# Patient Record
Sex: FEMALE | Race: WHITE | Employment: STUDENT | URBAN - METROPOLITAN AREA
[De-identification: names, ages, dates, MRNs, and addresses within clinical notes are randomized per-mention and may not be internally consistent; named-entity substitution may affect disease eponyms.]

---
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
## 2022-01-24 NOTE — PROGRESS NOTES
Abbey Alonzo is a 25year old female. Patient presents with: Follow - Up: on labs      HPI:   Anxiety: better, but still not great. Fatigue, joint pains in all joints, shoulders, hands. Decreased appetite.    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 both shoulder joints  -     RHEUMATOLOGY - INTERNAL  - refer to rheumatology for evaluation. Monitor abd pain for now.          Orders Placed This Encounter      CBC With Differential With Platelet          Standing Status: Future          Number of Occ

## 2022-02-03 ENCOUNTER — TELEPHONE (OUTPATIENT)
Dept: FAMILY MEDICINE CLINIC | Facility: CLINIC | Age: 22
End: 2022-02-03

## 2022-02-03 DIAGNOSIS — J30.89 ENVIRONMENTAL AND SEASONAL ALLERGIES: ICD-10-CM

## 2022-02-03 RX ORDER — MONTELUKAST SODIUM 10 MG/1
10 TABLET ORAL NIGHTLY
Qty: 90 TABLET | Refills: 1 | Status: SHIPPED | OUTPATIENT
Start: 2022-02-03

## 2022-02-03 NOTE — TELEPHONE ENCOUNTER
Asthma & COPD Medication Protocol Failed 02/03/2022 01:52 PM    Asthma Action Score greater than or equal to 20    AAP/ACT given in last 12 months    Appointment in past 6 or next 3 months    Routing to provider per protocol. montelukast 10 MG Oral Tab  Last refilled on 9/7/21 for #30  with 1 rf. Last labs 2/2/22. Last seen on 1/24/22. No future appointments. Thank you.

## 2022-02-03 NOTE — TELEPHONE ENCOUNTER
See results, iron is quite low and she is anemic. I would like her to take iron 325 mg twice daily for the next 4-6 weeks, then lets recheck her iron level and blood cell counts. This can definitely make her feel tired, and even make joint pains worse. Recall CBC and ferritin in 6 weeks.

## 2022-02-05 RX ORDER — NORTRIPTYLINE HYDROCHLORIDE 10 MG/1
20 CAPSULE ORAL NIGHTLY
Qty: 60 CAPSULE | Refills: 0 | Status: SHIPPED | OUTPATIENT
Start: 2022-02-05 | End: 2022-03-08

## 2022-02-09 ENCOUNTER — TELEPHONE (OUTPATIENT)
Dept: FAMILY MEDICINE CLINIC | Facility: CLINIC | Age: 22
End: 2022-02-09

## 2022-02-09 LAB
ABSOLUTE EOSINOPHILS: 59 CELLS/UL (ref 15–500)
ABSOLUTE LYMPHOCYTES: 1188 CELLS/UL (ref 850–3900)
ABSOLUTE MONOCYTES: 627 CELLS/UL (ref 200–950)
ABSOLUTE NEUTROPHILS: 4693 CELLS/UL (ref 1500–7800)
BASOPHILS: 0.5 %
EOSINOPHILS: 0.9 %
FERRITIN: 5 NG/ML (ref 16–154)
HEMATOCRIT: 32.3 % (ref 35–45)
HEMOGLOBIN: 10.2 G/DL (ref 11.7–15.5)
LYMPHOCYTES: 18 %
MCH: 24.8 PG (ref 27–33)
MCHC: 31.6 G/DL (ref 32–36)
MCV: 78.4 FL (ref 80–100)
MONOCYTES: 9.5 %
MPV: 10.9 FL (ref 7.5–12.5)
NEUTROPHILS: 71.1 %
PLATELET COUNT: 362 THOUSAND/UL (ref 140–400)
RDW: 14.3 % (ref 11–15)
RED BLOOD CELL COUNT: 4.12 MILLION/UL (ref 3.8–5.1)
WHITE BLOOD CELL COUNT: 6.6 THOUSAND/UL (ref 3.8–10.8)

## 2022-02-17 ENCOUNTER — TELEMEDICINE (OUTPATIENT)
Dept: NEUROLOGY | Facility: CLINIC | Age: 22
End: 2022-02-17
Payer: COMMERCIAL

## 2022-02-17 DIAGNOSIS — G43.909 MIGRAINE WITHOUT STATUS MIGRAINOSUS, NOT INTRACTABLE, UNSPECIFIED MIGRAINE TYPE: Primary | ICD-10-CM

## 2022-02-17 PROCEDURE — 99213 OFFICE O/P EST LOW 20 MIN: CPT | Performed by: HOSPITALIST

## 2022-02-17 RX ORDER — RIBOFLAVIN (VITAMIN B2) 400 MG
400 TABLET ORAL DAILY
Qty: 60 TABLET | Refills: 3 | Status: SHIPPED | OUTPATIENT
Start: 2022-02-17 | End: 2022-03-19

## 2022-02-17 RX ORDER — PREDNISONE 20 MG/1
TABLET ORAL
Qty: 3 TABLET | Refills: 0 | Status: SHIPPED | OUTPATIENT
Start: 2022-02-17 | End: 2022-02-19

## 2022-03-08 RX ORDER — NORTRIPTYLINE HYDROCHLORIDE 10 MG/1
CAPSULE ORAL
Qty: 60 CAPSULE | Refills: 2 | Status: SHIPPED | OUTPATIENT
Start: 2022-03-08

## 2022-03-15 ENCOUNTER — TELEPHONE (OUTPATIENT)
Dept: FAMILY MEDICINE CLINIC | Facility: CLINIC | Age: 22
End: 2022-03-15

## 2022-03-24 LAB
ABSOLUTE BASOPHILS: 44 CELLS/UL (ref 0–200)
ABSOLUTE EOSINOPHILS: 44 CELLS/UL (ref 15–500)
ABSOLUTE LYMPHOCYTES: 1584 CELLS/UL (ref 850–3900)
ABSOLUTE MONOCYTES: 854 CELLS/UL (ref 200–950)
ABSOLUTE NEUTROPHILS: 4774 CELLS/UL (ref 1500–7800)
BASOPHILS: 0.6 %
EOSINOPHILS: 0.6 %
FERRITIN: 11 NG/ML (ref 16–154)
HEMATOCRIT: 40.7 % (ref 35–45)
HEMOGLOBIN: 13.3 G/DL (ref 11.7–15.5)
LYMPHOCYTES: 21.7 %
MCH: 26.9 PG (ref 27–33)
MCHC: 32.7 G/DL (ref 32–36)
MCV: 82.2 FL (ref 80–100)
MONOCYTES: 11.7 %
MPV: 10.8 FL (ref 7.5–12.5)
NEUTROPHILS: 65.4 %
PLATELET COUNT: 355 THOUSAND/UL (ref 140–400)
RDW: 16.1 % (ref 11–15)
RED BLOOD CELL COUNT: 4.95 MILLION/UL (ref 3.8–5.1)
WHITE BLOOD CELL COUNT: 7.3 THOUSAND/UL (ref 3.8–10.8)

## 2022-04-12 ENCOUNTER — TELEPHONE (OUTPATIENT)
Dept: FAMILY MEDICINE CLINIC | Facility: CLINIC | Age: 22
End: 2022-04-12

## 2022-04-12 NOTE — TELEPHONE ENCOUNTER
Patient states chest pain started last year and was checked out.  ekg was fine. Month and a half ago started getting worse. A little short of breath. Sitting makes it better. A little bit of dizziness when she goes up stairs. States she feels like she might pass out once a day. Usually if she moves too fast.     States she started levothyroxine 25 mcg about a month and a half ago. Has not had thyroid rechecked. Discussed with KE, OK to see later this week as this has been ongoing the past 1-2 months. If any worsening go to ER    Patient notified and verbalized understanding.    Future Appointments   Date Time Provider Miguel Angel Gotti   4/14/2022 11:30 AM DO MEÑO Akers EMG Vania Alvarez

## 2022-04-14 ENCOUNTER — OFFICE VISIT (OUTPATIENT)
Dept: FAMILY MEDICINE CLINIC | Facility: CLINIC | Age: 22
End: 2022-04-14
Payer: COMMERCIAL

## 2022-04-14 VITALS
HEIGHT: 68 IN | OXYGEN SATURATION: 98 % | BODY MASS INDEX: 19.55 KG/M2 | RESPIRATION RATE: 16 BRPM | TEMPERATURE: 99 F | WEIGHT: 129 LBS

## 2022-04-14 DIAGNOSIS — R42 ORTHOSTATIC DIZZINESS: ICD-10-CM

## 2022-04-14 DIAGNOSIS — D50.9 IRON DEFICIENCY ANEMIA, UNSPECIFIED IRON DEFICIENCY ANEMIA TYPE: ICD-10-CM

## 2022-04-14 DIAGNOSIS — R07.89 OTHER CHEST PAIN: Primary | ICD-10-CM

## 2022-04-14 DIAGNOSIS — R00.0 TACHYCARDIA: ICD-10-CM

## 2022-04-14 PROBLEM — R53.82 CHRONIC FATIGUE: Status: ACTIVE | Noted: 2022-02-03

## 2022-04-14 PROBLEM — R76.8 THYROID ANTIBODY POSITIVE: Status: ACTIVE | Noted: 2022-02-03

## 2022-04-14 PROBLEM — F41.9 ANXIETY: Status: ACTIVE | Noted: 2022-02-03

## 2022-04-14 PROCEDURE — 3008F BODY MASS INDEX DOCD: CPT | Performed by: FAMILY MEDICINE

## 2022-04-14 PROCEDURE — 99214 OFFICE O/P EST MOD 30 MIN: CPT | Performed by: FAMILY MEDICINE

## 2022-04-18 ENCOUNTER — HOSPITAL ENCOUNTER (OUTPATIENT)
Dept: GENERAL RADIOLOGY | Age: 22
Discharge: HOME | End: 2022-04-18
Attending: FAMILY MEDICINE
Payer: OTHER GOVERNMENT

## 2022-04-18 DIAGNOSIS — R00.0 TACHYCARDIA: ICD-10-CM

## 2022-04-18 DIAGNOSIS — R07.89 OTHER CHEST PAIN: ICD-10-CM

## 2022-04-18 PROCEDURE — 93225 XTRNL ECG REC<48 HRS REC: CPT | Performed by: FAMILY MEDICINE

## 2022-04-27 ENCOUNTER — HOSPITAL ENCOUNTER (OUTPATIENT)
Dept: CV DIAGNOSTICS | Age: 22
Discharge: HOME | End: 2022-04-27
Attending: FAMILY MEDICINE
Payer: OTHER GOVERNMENT

## 2022-04-27 DIAGNOSIS — R42 ORTHOSTATIC DIZZINESS: ICD-10-CM

## 2022-04-27 DIAGNOSIS — R00.0 TACHYCARDIA: ICD-10-CM

## 2022-04-27 DIAGNOSIS — R07.89 OTHER CHEST PAIN: ICD-10-CM

## 2022-04-27 PROCEDURE — 93306 TTE W/DOPPLER COMPLETE: CPT | Performed by: FAMILY MEDICINE

## 2022-05-02 RX ORDER — ESCITALOPRAM OXALATE 10 MG/1
10 TABLET ORAL DAILY
Qty: 90 TABLET | Refills: 0 | Status: SHIPPED | OUTPATIENT
Start: 2022-05-02

## 2022-05-04 RX ORDER — ESCITALOPRAM OXALATE 10 MG/1
10 TABLET ORAL DAILY
Qty: 90 TABLET | Refills: 0 | OUTPATIENT
Start: 2022-05-04

## 2022-05-17 ENCOUNTER — TELEPHONE (OUTPATIENT)
Dept: FAMILY MEDICINE CLINIC | Facility: CLINIC | Age: 22
End: 2022-05-17

## 2022-05-17 NOTE — TELEPHONE ENCOUNTER
Patient states fever started Friday, temp 100.2  No temp yesterday  Had some body aches and chills. States she is a little congested. Patient states she can not go to work with rash on her hands. Works at [employer] as a .     Will send picture via Vidyo

## 2022-05-17 NOTE — TELEPHONE ENCOUNTER
Note written for today. Use vaseline or aquaphor to sooth it today and let me know if it's still there later this week.

## 2022-05-17 NOTE — TELEPHONE ENCOUNTER
Patient had fever for 4 days. Once fever subsided, a rash appeared on both hands.   Very itchy  Looks like hives  Keeps getting worse    Benadryl both topical and oral doesn't seem to be helping much    Please adv    Thank you

## 2022-05-31 ENCOUNTER — OFFICE VISIT (OUTPATIENT)
Dept: RHEUMATOLOGY | Facility: CLINIC | Age: 22
End: 2022-05-31
Payer: OTHER GOVERNMENT

## 2022-05-31 VITALS
OXYGEN SATURATION: 99 % | HEIGHT: 68 IN | TEMPERATURE: 98 F | WEIGHT: 130 LBS | RESPIRATION RATE: 16 BRPM | DIASTOLIC BLOOD PRESSURE: 60 MMHG | HEART RATE: 104 BPM | SYSTOLIC BLOOD PRESSURE: 98 MMHG | BODY MASS INDEX: 19.7 KG/M2

## 2022-05-31 DIAGNOSIS — R76.8 THYROID ANTIBODY POSITIVE: ICD-10-CM

## 2022-05-31 DIAGNOSIS — R04.0 EPISTAXIS, RECURRENT: ICD-10-CM

## 2022-05-31 DIAGNOSIS — R76.8 ANA POSITIVE: Primary | ICD-10-CM

## 2022-05-31 DIAGNOSIS — R53.82 CHRONIC FATIGUE: ICD-10-CM

## 2022-05-31 DIAGNOSIS — M25.50 POLYARTHRALGIA: ICD-10-CM

## 2022-05-31 DIAGNOSIS — D50.9 IRON DEFICIENCY ANEMIA, UNSPECIFIED IRON DEFICIENCY ANEMIA TYPE: ICD-10-CM

## 2022-05-31 DIAGNOSIS — E55.9 VITAMIN D DEFICIENCY: ICD-10-CM

## 2022-05-31 PROCEDURE — 99244 OFF/OP CNSLTJ NEW/EST MOD 40: CPT | Performed by: INTERNAL MEDICINE

## 2022-05-31 PROCEDURE — 3078F DIAST BP <80 MM HG: CPT | Performed by: INTERNAL MEDICINE

## 2022-05-31 PROCEDURE — 3008F BODY MASS INDEX DOCD: CPT | Performed by: INTERNAL MEDICINE

## 2022-05-31 PROCEDURE — 3074F SYST BP LT 130 MM HG: CPT | Performed by: INTERNAL MEDICINE

## 2022-06-14 DIAGNOSIS — R51.9 SINUS HEADACHE: ICD-10-CM

## 2022-06-15 RX ORDER — NORTRIPTYLINE HYDROCHLORIDE 10 MG/1
20 CAPSULE ORAL NIGHTLY
Qty: 60 CAPSULE | Refills: 0 | Status: SHIPPED | OUTPATIENT
Start: 2022-06-15

## 2022-06-17 LAB
ABSOLUTE BASOPHILS: 33 CELLS/UL (ref 0–200)
ABSOLUTE EOSINOPHILS: 72 CELLS/UL (ref 15–500)
ABSOLUTE LYMPHOCYTES: 1658 CELLS/UL (ref 850–3900)
ABSOLUTE MONOCYTES: 787 CELLS/UL (ref 200–950)
ABSOLUTE NEUTROPHILS: 3952 CELLS/UL (ref 1500–7800)
BASOPHILS: 0.5 %
EOSINOPHILS: 1.1 %
HEMATOCRIT: 38.5 % (ref 35–45)
HEMOGLOBIN: 12.7 G/DL (ref 11.7–15.5)
LYMPHOCYTES: 25.5 %
MCH: 28.7 PG (ref 27–33)
MCHC: 33 G/DL (ref 32–36)
MCV: 86.9 FL (ref 80–100)
MONOCYTES: 12.1 %
MPV: 10.5 FL (ref 7.5–12.5)
NEUTROPHILS: 60.8 %
PLATELET COUNT: 272 THOUSAND/UL (ref 140–400)
RDW: 12.2 % (ref 11–15)
RED BLOOD CELL COUNT: 4.43 MILLION/UL (ref 3.8–5.1)
WHITE BLOOD CELL COUNT: 6.5 THOUSAND/UL (ref 3.8–10.8)

## 2022-06-22 LAB
% SATURATION: 20 % (CALC) (ref 15–45)
14.3.3 ETA PROTEIN: <0.2 NG/ML
ALBUMIN/GLOBULIN RATIO: 1.5 (CALC) (ref 1–2.5)
ALBUMIN: 4.2 G/DL (ref 3.6–5.1)
ALKALINE PHOSPHATASE: 72 U/L (ref 36–128)
ALT: 15 U/L (ref 5–32)
ANA SCREEN, IFA: NEGATIVE
AST: 20 U/L (ref 12–32)
B2 GLYCOPROTEIN I (IGA)AB: <2 U/ML
B2 GLYCOPROTEIN I (IGG)AB: <2 U/ML
B2 GLYCOPROTEIN I(IGM)AB: <2 U/ML
BILIRUBIN, TOTAL: 0.3 MG/DL (ref 0.2–1.1)
BUN: 8 MG/DL (ref 7–20)
CALCIUM: 9.1 MG/DL (ref 8.9–10.4)
CARBON DIOXIDE: 24 MMOL/L (ref 20–32)
CARDIOLIPIN AB (IGA): <2 APL-U/ML
CARDIOLIPIN AB (IGG): <2 GPL-U/ML
CARDIOLIPIN AB (IGM): <2 MPL-U/ML
CHLORIDE: 103 MMOL/L (ref 98–110)
COMPLEMENT COMPONENT C3C: 104 MG/DL (ref 83–193)
COMPLEMENT COMPONENT C4C: 15 MG/DL (ref 15–57)
CREATININE: 0.66 MG/DL (ref 0.5–1)
CYCLIC CITRULLINATED$PEPTIDE (CCP) AB (IGG): <16 UNITS
DNA AB (DS) CRITHIDIA,IFA: NEGATIVE
EGFR IF AFRICN AM: 148 ML/MIN/1.73M2
EGFR IF NONAFRICN AM: 128 ML/MIN/1.73M2
FERRITIN: 18 NG/ML (ref 16–154)
GLOBULIN: 2.8 G/DL (CALC) (ref 2–3.8)
GLUCOSE: 66 MG/DL (ref 65–99)
IRON BINDING CAPACITY: 304 MCG/DL (CALC) (ref 271–448)
IRON, TOTAL: 62 MCG/DL (ref 27–164)
MYELOPEROXIDASE ANTIBODY: <1 AI
POTASSIUM: 3.7 MMOL/L (ref 3.8–5.1)
PROTEIN, TOTAL: 7 G/DL (ref 6.3–8.2)
PROTEINASE-3 ANTIBODY: <1 AI
RHEUMATOID FACTOR (IGA): <5 U
RHEUMATOID FACTOR (IGG): 6 U
RHEUMATOID FACTOR (IGM): <5 U
SODIUM: 138 MMOL/L (ref 135–146)
THYROID PEROXIDASE$ANTIBODIES: 169 IU/ML
VITAMIN D, 25-OH, TOTAL: 41 NG/ML (ref 30–100)

## 2022-06-28 ENCOUNTER — OFFICE VISIT (OUTPATIENT)
Dept: RHEUMATOLOGY | Facility: CLINIC | Age: 22
End: 2022-06-28
Payer: OTHER GOVERNMENT

## 2022-06-28 VITALS
DIASTOLIC BLOOD PRESSURE: 60 MMHG | RESPIRATION RATE: 16 BRPM | OXYGEN SATURATION: 99 % | HEIGHT: 68 IN | SYSTOLIC BLOOD PRESSURE: 102 MMHG | BODY MASS INDEX: 19.7 KG/M2 | WEIGHT: 130 LBS | HEART RATE: 102 BPM

## 2022-06-28 DIAGNOSIS — M25.50 POLYARTHRALGIA: Primary | ICD-10-CM

## 2022-06-28 DIAGNOSIS — R76.8 RIBONUCLEOPROTEIN ANTIBODY POSITIVE: ICD-10-CM

## 2022-06-28 DIAGNOSIS — E06.3 AUTOIMMUNE THYROIDITIS: ICD-10-CM

## 2022-06-28 DIAGNOSIS — R53.82 CHRONIC FATIGUE: ICD-10-CM

## 2022-06-28 PROCEDURE — 99214 OFFICE O/P EST MOD 30 MIN: CPT | Performed by: INTERNAL MEDICINE

## 2022-06-28 PROCEDURE — 3078F DIAST BP <80 MM HG: CPT | Performed by: INTERNAL MEDICINE

## 2022-06-28 PROCEDURE — 3008F BODY MASS INDEX DOCD: CPT | Performed by: INTERNAL MEDICINE

## 2022-06-28 PROCEDURE — 3074F SYST BP LT 130 MM HG: CPT | Performed by: INTERNAL MEDICINE

## 2022-06-28 RX ORDER — MELOXICAM 7.5 MG/1
7.5 TABLET ORAL DAILY PRN
Qty: 30 TABLET | Refills: 0 | Status: SHIPPED | OUTPATIENT
Start: 2022-06-28

## 2022-07-14 ENCOUNTER — OFFICE VISIT (OUTPATIENT)
Dept: FAMILY MEDICINE CLINIC | Facility: CLINIC | Age: 22
End: 2022-07-14
Payer: COMMERCIAL

## 2022-07-14 VITALS
RESPIRATION RATE: 16 BRPM | OXYGEN SATURATION: 98 % | SYSTOLIC BLOOD PRESSURE: 110 MMHG | HEIGHT: 68 IN | BODY MASS INDEX: 19.4 KG/M2 | WEIGHT: 128 LBS | DIASTOLIC BLOOD PRESSURE: 70 MMHG | TEMPERATURE: 97 F | HEART RATE: 89 BPM

## 2022-07-14 DIAGNOSIS — R76.8 THYROID ANTIBODY POSITIVE: ICD-10-CM

## 2022-07-14 DIAGNOSIS — I49.8 POTS (POSTURAL ORTHOSTATIC TACHYCARDIA SYNDROME): Primary | ICD-10-CM

## 2022-07-14 PROCEDURE — 99214 OFFICE O/P EST MOD 30 MIN: CPT | Performed by: FAMILY MEDICINE

## 2022-07-14 PROCEDURE — 3074F SYST BP LT 130 MM HG: CPT | Performed by: FAMILY MEDICINE

## 2022-07-14 PROCEDURE — 3078F DIAST BP <80 MM HG: CPT | Performed by: FAMILY MEDICINE

## 2022-07-14 PROCEDURE — 3008F BODY MASS INDEX DOCD: CPT | Performed by: FAMILY MEDICINE

## 2022-07-15 PROBLEM — E06.3 HASHIMOTO'S THYROIDITIS: Status: ACTIVE | Noted: 2022-06-02

## 2022-07-15 PROBLEM — R00.0 TACHYCARDIA: Status: ACTIVE | Noted: 2022-06-05

## 2022-07-25 DIAGNOSIS — R51.9 SINUS HEADACHE: ICD-10-CM

## 2022-07-25 DIAGNOSIS — M25.50 POLYARTHRALGIA: ICD-10-CM

## 2022-07-25 RX ORDER — MELOXICAM 7.5 MG/1
TABLET ORAL
Qty: 30 TABLET | Refills: 0 | Status: SHIPPED | OUTPATIENT
Start: 2022-07-25

## 2022-07-25 NOTE — TELEPHONE ENCOUNTER
Future Appointments   Date Time Provider Miguel Angel Gotti   12/28/2022  9:00 AM Latha Majano DO EMGRHEUMHBSN EMG Megan     LOV 6/29/22  RTO in 6mo

## 2022-08-02 DIAGNOSIS — F41.9 ANXIETY: ICD-10-CM

## 2022-08-02 RX ORDER — ESCITALOPRAM OXALATE 10 MG/1
TABLET ORAL
Qty: 90 TABLET | Refills: 0 | Status: SHIPPED | OUTPATIENT
Start: 2022-08-02

## 2022-08-02 NOTE — TELEPHONE ENCOUNTER
Last refilled 5/2/22 for #90 with 0 RF  LOV with KE 7/14/22  No future appt with pcp  Protocol: none

## 2022-08-16 ENCOUNTER — TELEPHONE (OUTPATIENT)
Dept: FAMILY MEDICINE CLINIC | Facility: CLINIC | Age: 22
End: 2022-08-16

## 2022-08-16 NOTE — TELEPHONE ENCOUNTER
Letter mailed to patient reminding her she has overdue labs.        Order Code Tests Ordered (Total: 2)    Order Code Tests Ordered      899 ASSAY, THYROID STIM HORMONE    866 FREE T4 (FREE THYROXINE)

## 2022-08-22 RX ORDER — NORTRIPTYLINE HYDROCHLORIDE 10 MG/1
CAPSULE ORAL
Qty: 60 CAPSULE | Refills: 0 | OUTPATIENT
Start: 2022-08-22

## 2022-08-22 NOTE — TELEPHONE ENCOUNTER
Refilled refused Nortriptyline. Last ordered on 6/15/22 for 30 days. Patient required to have a follow up with new Neurologist in office.

## 2022-09-14 LAB
T4, FREE: 1.1 NG/DL (ref 0.8–1.4)
TSH: 1.41 MIU/L

## 2022-11-02 DIAGNOSIS — F41.9 ANXIETY: ICD-10-CM

## 2022-11-02 NOTE — TELEPHONE ENCOUNTER
LOV: 7/14/22   Last Refill: 8/2/22 90 0 RF    Future Appointments   Date Time Provider Miguel Angel Gotti   12/28/2022  9:00 AM Live Majano, DO EMGRHEUMHBSN EMG Jr Galo

## 2022-11-03 RX ORDER — ESCITALOPRAM OXALATE 10 MG/1
TABLET ORAL
Qty: 90 TABLET | Refills: 0 | Status: SHIPPED | OUTPATIENT
Start: 2022-11-03

## 2022-12-28 ENCOUNTER — OFFICE VISIT (OUTPATIENT)
Dept: RHEUMATOLOGY | Facility: CLINIC | Age: 22
End: 2022-12-28
Payer: COMMERCIAL

## 2022-12-28 VITALS
HEIGHT: 68 IN | DIASTOLIC BLOOD PRESSURE: 54 MMHG | RESPIRATION RATE: 16 BRPM | HEART RATE: 86 BPM | WEIGHT: 128 LBS | BODY MASS INDEX: 19.4 KG/M2 | OXYGEN SATURATION: 100 % | SYSTOLIC BLOOD PRESSURE: 114 MMHG | TEMPERATURE: 98 F

## 2022-12-28 DIAGNOSIS — R53.82 CHRONIC FATIGUE: ICD-10-CM

## 2022-12-28 DIAGNOSIS — M25.50 POLYARTHRALGIA: Primary | ICD-10-CM

## 2022-12-28 DIAGNOSIS — R76.8 RIBONUCLEOPROTEIN ANTIBODY POSITIVE: ICD-10-CM

## 2022-12-28 DIAGNOSIS — E55.9 VITAMIN D DEFICIENCY: ICD-10-CM

## 2022-12-28 DIAGNOSIS — R76.8 THYROID ANTIBODY POSITIVE: ICD-10-CM

## 2022-12-28 PROCEDURE — 99214 OFFICE O/P EST MOD 30 MIN: CPT | Performed by: INTERNAL MEDICINE

## 2022-12-28 PROCEDURE — 3008F BODY MASS INDEX DOCD: CPT | Performed by: INTERNAL MEDICINE

## 2022-12-28 PROCEDURE — 3078F DIAST BP <80 MM HG: CPT | Performed by: INTERNAL MEDICINE

## 2022-12-28 PROCEDURE — 3074F SYST BP LT 130 MM HG: CPT | Performed by: INTERNAL MEDICINE

## 2022-12-29 ENCOUNTER — TELEPHONE (OUTPATIENT)
Dept: RHEUMATOLOGY | Facility: CLINIC | Age: 22
End: 2022-12-29

## 2023-01-11 LAB
ANA SCREEN, IFA: NEGATIVE
C-REACTIVE PROTEIN: 0.3 MG/L
COMPLEMENT COMPONENT C3C: 104 MG/DL (ref 83–193)
COMPLEMENT COMPONENT C4C: 12 MG/DL (ref 15–57)
IMMUNOGLOBULIN A: 170 MG/DL (ref 47–310)
IMMUNOGLOBULIN E: 58 KU/L
IMMUNOGLOBULIN G: 1634 MG/DL (ref 600–1640)
IMMUNOGLOBULIN M: 112 MG/DL (ref 50–300)
SED RATE BY MODIFIED$WESTERGREN: 2 MM/H
TRYPTASE: 6.3 MCG/L
TSH W/REFLEX TO FT4: 1.26 MIU/L
VITAMIN B12: 1217 PG/ML (ref 200–1100)
VITAMIN D, 25-OH, TOTAL: 35 NG/ML (ref 30–100)

## 2023-01-12 ENCOUNTER — TELEPHONE (OUTPATIENT)
Dept: RHEUMATOLOGY | Facility: CLINIC | Age: 23
End: 2023-01-12

## 2023-01-12 NOTE — TELEPHONE ENCOUNTER
Phoned pt, spoke to pt mom regarding test results per Dr. Violette rPiest result note to pt  \"my workup has been negative. MALGORZATA testing still negative. Inflammatory markers are normal. There was a borderline low complement 4 level which is nonspecific. The tryptase levels were normal and the quantitative immunoglobulins were normal. I would continue present management. Okay to follow up in 6 months or 1 year per pt preference\"  Voiced understanding, will call back with any difficulties.

## 2023-02-02 DIAGNOSIS — F41.9 ANXIETY: ICD-10-CM

## 2023-02-06 NOTE — TELEPHONE ENCOUNTER
Has appt scheduled tomorrow.  Will need refill at that time    Future Appointments   Date Time Provider Miguel Angel Gotti   2/7/2023 11:00 AM Bailee Louise [facility] CHIDI Fisher

## 2023-02-07 RX ORDER — ESCITALOPRAM OXALATE 10 MG/1
TABLET ORAL
Qty: 90 TABLET | Refills: 0 | OUTPATIENT
Start: 2023-02-07

## 2023-05-14 DIAGNOSIS — F41.9 ANXIETY: ICD-10-CM

## 2023-05-15 RX ORDER — ESCITALOPRAM OXALATE 10 MG/1
15 TABLET ORAL DAILY
Qty: 45 TABLET | Refills: 1 | Status: SHIPPED | OUTPATIENT
Start: 2023-05-15

## 2023-08-21 DIAGNOSIS — F41.9 ANXIETY: ICD-10-CM

## 2023-08-21 RX ORDER — ESCITALOPRAM OXALATE 10 MG/1
10 TABLET ORAL DAILY
Qty: 30 TABLET | Refills: 0 | Status: SHIPPED | OUTPATIENT
Start: 2023-08-21

## 2023-09-23 ENCOUNTER — OFFICE VISIT (OUTPATIENT)
Dept: FAMILY MEDICINE CLINIC | Facility: CLINIC | Age: 23
End: 2023-09-23
Payer: COMMERCIAL

## 2023-09-23 VITALS
WEIGHT: 135 LBS | DIASTOLIC BLOOD PRESSURE: 58 MMHG | HEART RATE: 95 BPM | OXYGEN SATURATION: 98 % | HEIGHT: 68 IN | RESPIRATION RATE: 18 BRPM | TEMPERATURE: 98 F | BODY MASS INDEX: 20.46 KG/M2 | SYSTOLIC BLOOD PRESSURE: 96 MMHG

## 2023-09-23 DIAGNOSIS — J01.00 ACUTE NON-RECURRENT MAXILLARY SINUSITIS: Primary | ICD-10-CM

## 2023-09-23 DIAGNOSIS — J02.9 SORE THROAT: ICD-10-CM

## 2023-09-23 LAB
CONTROL LINE PRESENT WITH A CLEAR BACKGROUND (YES/NO): YES YES/NO
KIT LOT #: NORMAL NUMERIC
OPERATOR ID: NORMAL
RAPID SARS-COV-2 BY PCR: NOT DETECTED
STREP GRP A CUL-SCR: NEGATIVE

## 2023-09-23 RX ORDER — AMOXICILLIN AND CLAVULANATE POTASSIUM 875; 125 MG/1; MG/1
1 TABLET, FILM COATED ORAL 2 TIMES DAILY
Qty: 20 TABLET | Refills: 0 | Status: SHIPPED | OUTPATIENT
Start: 2023-09-23 | End: 2023-10-03

## 2023-09-23 NOTE — PATIENT INSTRUCTIONS
1. Rest. Drink plenty of fluids. 2. Supportive care as discussed. Augmentin as prescribed. 3. Salt water gargles three times daily  4. Use humidifier at home when possible. 5. The rapid strep test was negative today. 6. Covid-19 test negative. 7. Follow up with PMD in 4-5 days for re-eval. Go to the emergency department immediately if symptoms worsen, change, you develop chest discomfort, wheezing, shortness of breath, or if you have any concerns.

## 2024-03-19 ENCOUNTER — OFFICE VISIT (OUTPATIENT)
Dept: OBGYN CLINIC | Facility: CLINIC | Age: 24
End: 2024-03-19
Payer: COMMERCIAL

## 2024-03-19 VITALS
WEIGHT: 134.81 LBS | BODY MASS INDEX: 20.43 KG/M2 | SYSTOLIC BLOOD PRESSURE: 110 MMHG | HEIGHT: 68 IN | DIASTOLIC BLOOD PRESSURE: 64 MMHG

## 2024-03-19 DIAGNOSIS — Z12.4 CERVICAL CANCER SCREENING: ICD-10-CM

## 2024-03-19 DIAGNOSIS — Z11.3 SCREEN FOR STD (SEXUALLY TRANSMITTED DISEASE): ICD-10-CM

## 2024-03-19 DIAGNOSIS — N92.6 IRREGULAR MENSES: ICD-10-CM

## 2024-03-19 DIAGNOSIS — N94.6 DYSMENORRHEA: ICD-10-CM

## 2024-03-19 DIAGNOSIS — Z01.419 WELL WOMAN EXAM WITH ROUTINE GYNECOLOGICAL EXAM: Primary | ICD-10-CM

## 2024-03-19 PROCEDURE — 87591 N.GONORRHOEAE DNA AMP PROB: CPT | Performed by: NURSE PRACTITIONER

## 2024-03-19 PROCEDURE — 88175 CYTOPATH C/V AUTO FLUID REDO: CPT | Performed by: NURSE PRACTITIONER

## 2024-03-19 PROCEDURE — 87491 CHLMYD TRACH DNA AMP PROBE: CPT | Performed by: NURSE PRACTITIONER

## 2024-03-19 PROCEDURE — 99395 PREV VISIT EST AGE 18-39: CPT | Performed by: NURSE PRACTITIONER

## 2024-03-19 RX ORDER — SODIUM FLUORIDE AND POTASSIUM NITRATE 5.8; 57.5 MG/ML; MG/ML
1 GEL, DENTIFRICE DENTAL DAILY
COMMUNITY
Start: 2024-02-14

## 2024-03-19 NOTE — PROGRESS NOTES
Here for new gynecology visit.  23 year old G 0 P0.  Patient's last menstrual period was 2024 (approximate)..     Here for Annual Gynecologic Exam.     Menses 1-3 months since their onset for 7 days.  Condoms for contraception.    The last 18 months they have gotten heavier and more painful. She takes Aleve and it is manageable.     OB Hx:  neg.    Family gyn hx:  mom has endometriosis, sister has PCOS.   Family breast hx:  neg.  Last mammogram in neg.  Screening labs .    Past Medical History:   Diagnosis Date    Allergic rhinitis     Anxiety     Bleeding nose     Eczema     Hashimoto's disease      Past Surgical History:   Procedure Laterality Date    ADENOIDECTOMY      TONSILLECTOMY      WISDOM TEETH REMOVED  2021     Current Outpatient Medications on File Prior to Visit   Medication Sig Dispense Refill    PREVIDENT 5000 ENAMEL PROTECT 1.1-5 % Dental Gel Place 1 Application onto teeth daily.      escitalopram 10 MG Oral Tab Take 1 tablet (10 mg total) by mouth daily. 30 tablet 0    montelukast 10 MG Oral Tab Take 1 tablet (10 mg total) by mouth nightly. (Patient not taking: Reported on 2023) 90 tablet 1    Albuterol Sulfate  (90 Base) MCG/ACT Inhalation Aero Soln Inhale 2 puffs into the lungs every 6 (six) hours as needed for Wheezing or Shortness of Breath. 1 Inhaler 0    cetirizine 10 MG Oral Tab Take 1 tablet (10 mg total) by mouth daily.       No current facility-administered medications on file prior to visit.     OB History    Para Term  AB Living   0 0 0 0 0 0   SAB IAB Ectopic Multiple Live Births   0 0 0 0 0       ROS:    General:  No wt loss, wt gain, appetite changes.  Breasts:  No pain, lumps or secretions.  :   No urgency, frequency, ZAHIRA, bladder problems in past.               /64   Ht 68\"   Wt 134 lb 12.8 oz (61.1 kg)   LMP 2024 (Approximate)   BMI 20.50 kg/m²     NECK:  Thyroid normal size without nodules. No adenopathy.  LUNGS:  Clear to  auscultation.  COR;  Regular rate and rhythm.    BREASTS:  symmetrical in shape. No masses, tenderness, secretions, or adenopathy.  ABDOMEN:  Soft and non tender.  No organomegaly.  No inguinal adenopathy.  VULVA:  No lesions or erythema.  VAGINA:  No lesions, small blood noted.  CERVIX:  NO lesions or CMT.  Pap smear done with GC/CHL.  UTERUS:  anteflexed and normal size.  ADNEXA:  No pain or masses    IMP/PLAN:    1. Well woman exam with routine gynecological exam  Regular self breast exams recommended    2. Cervical cancer screening  - ThinPrep Pap with HPV Reflex, Chlamydia/GC; Future  - ThinPrep Pap with HPV Reflex, Chlamydia/GC    3. Dysmenorrhea  - Pelvic US Complete GYNE Only [[id number]]; Future    4. Irregular menses  - Pelvic US Complete GYNE Only [14034/16413]; Future  - Prolactin  - FSH  - Estradiol  - LH (Luteinizing Hormone) [E]  - Testosterone Total  - TSH W Reflex To Free T4  She is to call if she goes longer than 3 months without a menses    5. Screen for STD (sexually transmitted disease)  - Chlamydia/Gc Amplification    She declines OCP for menstrual regulation or cramps.

## 2024-03-20 LAB
C TRACH DNA SPEC QL NAA+PROBE: NEGATIVE
N GONORRHOEA DNA SPEC QL NAA+PROBE: NEGATIVE

## 2024-03-25 LAB
.: NORMAL
.: NORMAL

## 2024-04-11 ENCOUNTER — ULTRASOUND ENCOUNTER (OUTPATIENT)
Dept: OBGYN CLINIC | Facility: CLINIC | Age: 24
End: 2024-04-11
Payer: COMMERCIAL

## 2024-04-11 DIAGNOSIS — N92.6 IRREGULAR MENSES: ICD-10-CM

## 2024-04-11 DIAGNOSIS — N94.6 DYSMENORRHEA: ICD-10-CM

## 2024-04-11 PROCEDURE — 76830 TRANSVAGINAL US NON-OB: CPT | Performed by: OBSTETRICS & GYNECOLOGY

## 2024-04-11 PROCEDURE — 76856 US EXAM PELVIC COMPLETE: CPT | Performed by: OBSTETRICS & GYNECOLOGY

## 2024-04-28 LAB
ESTRADIOL: 28 PG/ML
FSH: 6.6 MIU/ML
LH: 5.4 MIU/ML
PROLACTIN: 17.8 NG/ML
TESTOSTERONE, TOTAL,$/MS/MS: 22 NG/DL (ref 2–45)
TSH W/REFLEX TO FT4: 1.22 MIU/L

## 2024-08-09 ENCOUNTER — OFFICE VISIT (OUTPATIENT)
Dept: FAMILY MEDICINE CLINIC | Facility: CLINIC | Age: 24
End: 2024-08-09
Payer: COMMERCIAL

## 2024-08-09 VITALS
SYSTOLIC BLOOD PRESSURE: 100 MMHG | HEART RATE: 73 BPM | DIASTOLIC BLOOD PRESSURE: 60 MMHG | OXYGEN SATURATION: 100 % | WEIGHT: 137.81 LBS | BODY MASS INDEX: 21 KG/M2 | RESPIRATION RATE: 20 BRPM | TEMPERATURE: 98 F

## 2024-08-09 DIAGNOSIS — K59.09 CHRONIC CONSTIPATION: Primary | ICD-10-CM

## 2024-08-09 DIAGNOSIS — E28.2 PCOS (POLYCYSTIC OVARIAN SYNDROME): ICD-10-CM

## 2024-08-09 PROCEDURE — 99214 OFFICE O/P EST MOD 30 MIN: CPT | Performed by: FAMILY MEDICINE

## 2024-08-09 NOTE — PROGRESS NOTES
Delfina Klein is a 23 year old female.  Chief Complaint   Patient presents with    Pain     Stomach     Constipation       HPI:   Constipation has been going on her whole life, but worse the past 2 -3 months. More painful.   Going a week without a BM at times.   Has tried dulcolax. Does not help.   Tried MOM, did not help.   Has tried miralax in the past, but not recently.   Has not done any suppositories.   Keeping hydrated. Taking fiber gummies and a pre and probiotic.   Avoiding fast food.   Once last week she had stomach cramps that were bad.     Had an US that showed cyst on left ovary.   Pain tends to be in LLQ.   LMP 7/1/24. Uses condoms.     Anxiety: doing well with lexapro. Working full time at [employer].     ALLERGIES:  No Known Allergies      Current Outpatient Medications   Medication Sig Dispense Refill    PREVIDENT 5000 ENAMEL PROTECT 1.1-5 % Dental Gel Place 1 Application onto teeth daily.      cetirizine 10 MG Oral Tab Take 1 tablet (10 mg total) by mouth daily.      escitalopram 10 MG Oral Tab Take 1 tablet (10 mg total) by mouth daily. (Patient not taking: Reported on 8/9/2024) 30 tablet 0    montelukast 10 MG Oral Tab Take 1 tablet (10 mg total) by mouth nightly. (Patient not taking: Reported on 4/4/2023) 90 tablet 1    Albuterol Sulfate  (90 Base) MCG/ACT Inhalation Aero Soln Inhale 2 puffs into the lungs every 6 (six) hours as needed for Wheezing or Shortness of Breath. (Patient not taking: Reported on 8/9/2024) 1 Inhaler 0      Past Medical History:    Allergic rhinitis    Anxiety    Bleeding nose    Eczema    Hashimoto's disease      Social History:  Social History     Socioeconomic History    Marital status: Single   Tobacco Use    Smoking status: Never    Smokeless tobacco: Never    Tobacco comments:     non-smoker   Vaping Use    Vaping status: Never Used   Substance and Sexual Activity    Alcohol use: Never    Drug use: Never    Sexual activity: Yes     Partners: Male     Birth  control/protection: Condom   Other Topics Concern    Caffeine Concern Yes     Comment: coffee occa    Exercise No     Social Determinants of Health      [facility]    Housing Stability        BP Readings from Last 6 Encounters:   08/09/24 100/60   03/19/24 110/64   09/23/23 96/58   04/04/23 102/64   02/07/23 104/60   12/28/22 114/54       Wt Readings from Last 6 Encounters:   08/09/24 137 lb 12.8 oz (62.5 kg)   03/19/24 134 lb 12.8 oz (61.1 kg)   09/23/23 135 lb (61.2 kg)   04/04/23 134 lb (60.8 kg)   02/07/23 130 lb (59 kg)   12/28/22 128 lb (58.1 kg)       REVIEW OF SYSTEMS:   GENERAL HEALTH: feels well no complaints  SKIN: denies any unusual skin lesions or rashes  RESPIRATORY: denies shortness of breath with exertion  CARDIOVASCULAR: denies chest pain on exertion  GI: denies abdominal pain and denies heartburn, see HPI   NEURO: denies headaches    EXAM:   /60 (BP Location: Right arm, Patient Position: Sitting, Cuff Size: adult)   Pulse 73   Temp 98.1 °F (36.7 °C) (Temporal)   Resp 20   Wt 137 lb 12.8 oz (62.5 kg)   LMP 07/17/2024 (Approximate)   SpO2 100%   BMI 20.95 kg/m²  Body mass index is 20.95 kg/m².      GENERAL: well developed, well nourished,in no apparent distress  SKIN: no rashes,no suspicious lesions  HEENT: atraumatic, normocephalic,ears and throat are clear  NECK: supple,no adenopathy   LUNGS: clear to auscultation  CARDIO: RRR without murmur  GI: good BS's,no masses, HSM or tenderness  EXTREMITIES: no cyanosis, clubbing or edema    ASSESSMENT AND PLAN:     Encounter Diagnoses   Name Primary?    Chronic constipation Yes    PCOS (polycystic ovarian syndrome)        Diagnoses and all orders for this visit:    Chronic constipation    PCOS (polycystic ovarian syndrome)    Hydrate. Add fiber. Adjust diet.   Add miralax daily. If not helping, then consider GI consult.     Follow up with gyne for PCOS, that might be affecting  her bowels as well or at least cramping.     No orders of the defined types were placed in this encounter.              Meds & Refills for this Visit:  Requested Prescriptions      No prescriptions requested or ordered in this encounter             The patient indicates understanding of these issues and agrees to the plan.

## 2025-04-08 ENCOUNTER — PATIENT MESSAGE (OUTPATIENT)
Dept: OBGYN CLINIC | Facility: CLINIC | Age: 25
End: 2025-04-08

## 2025-04-22 ENCOUNTER — OFFICE VISIT (OUTPATIENT)
Dept: OBGYN CLINIC | Facility: CLINIC | Age: 25
End: 2025-04-22
Payer: COMMERCIAL

## 2025-04-22 VITALS
HEIGHT: 68 IN | HEART RATE: 78 BPM | WEIGHT: 135.25 LBS | BODY MASS INDEX: 20.5 KG/M2 | SYSTOLIC BLOOD PRESSURE: 104 MMHG | DIASTOLIC BLOOD PRESSURE: 70 MMHG

## 2025-04-22 DIAGNOSIS — Z01.419 WELL WOMAN EXAM WITH ROUTINE GYNECOLOGICAL EXAM: Primary | ICD-10-CM

## 2025-04-22 DIAGNOSIS — R10.2 PELVIC PAIN: ICD-10-CM

## 2025-04-22 DIAGNOSIS — Z11.3 SCREEN FOR STD (SEXUALLY TRANSMITTED DISEASE): ICD-10-CM

## 2025-04-22 DIAGNOSIS — N89.8 VAGINAL LEUKORRHEA: ICD-10-CM

## 2025-04-22 PROCEDURE — 87491 CHLMYD TRACH DNA AMP PROBE: CPT | Performed by: NURSE PRACTITIONER

## 2025-04-22 PROCEDURE — 99395 PREV VISIT EST AGE 18-39: CPT | Performed by: NURSE PRACTITIONER

## 2025-04-22 PROCEDURE — 87591 N.GONORRHOEAE DNA AMP PROB: CPT | Performed by: NURSE PRACTITIONER

## 2025-04-22 RX ORDER — METRONIDAZOLE 500 MG/1
500 TABLET ORAL 2 TIMES DAILY WITH MEALS
Qty: 14 TABLET | Refills: 0 | Status: SHIPPED | OUTPATIENT
Start: 2025-04-22 | End: 2025-04-29

## 2025-04-22 NOTE — PROGRESS NOTES
Here for Routine Annual Exam  Increased left sided pelvic pain with her menses and about 1-2 times weekly otherwise. It is sharp when not on menses.  Menses are regular.  Contraception- condoms.    ROS: No Cardiac, Respiratory, GI,  or Neurological symptoms.    PE:  GENERAL: well developed, well nourished, in no apparent distress  SKIN: no rashes, no suspicious lesions  HEENT: normal  NECK: supple; no thyroidmegaly, no adenopathy  LUNGS: clear to auscultation  CARDIOVASCULAR: normal S1, S2, RRR  BREASTS: firm, nontendder, no palpable masses or nodes, no nipple discharge, no skin changes, no axillary adenopathy,    ABDOMEN: Soft, non distended; non tender, no masses  GYNE/: External Genitalia: Normal without lesions or erythema                      Vagina: normal without lesions, moderate thin white discharge                      Uterus: mid, mobile, non tender, normal size                     Cervix: no lesions or CMT                     Adnexa: non tender, no masses, normal size  EXTREMITIES:  non tender without edema    A/P:   1. Well woman exam with routine gynecological exam  Regular self breast exams recommended    2. Pelvic pain  - US PELVIS W EV (CPT=76856/91473); Future    3. Screen for STD (sexually transmitted disease)  - Chlamydia/Gc Amplification    4. Vaginal leukorrhea  - metroNIDAZOLE (FLAGYL) 500 MG Oral Tab; Take 1 tablet (500 mg total) by mouth 2 (two) times daily with meals for 7 days.  Dispense: 14 tablet; Refill: 0     Return to clinic 1 year for routine exam, or as needed with any concerns or question

## 2025-04-23 LAB
C TRACH DNA SPEC QL NAA+PROBE: NEGATIVE
N GONORRHOEA DNA SPEC QL NAA+PROBE: NEGATIVE

## 2025-05-03 ENCOUNTER — PATIENT MESSAGE (OUTPATIENT)
Dept: OBGYN CLINIC | Facility: CLINIC | Age: 25
End: 2025-05-03

## 2025-05-06 ENCOUNTER — PATIENT MESSAGE (OUTPATIENT)
Dept: FAMILY MEDICINE CLINIC | Facility: CLINIC | Age: 25
End: 2025-05-06

## 2025-05-13 ENCOUNTER — OFFICE VISIT (OUTPATIENT)
Dept: OBGYN CLINIC | Facility: CLINIC | Age: 25
End: 2025-05-13
Payer: COMMERCIAL

## 2025-05-13 VITALS
HEART RATE: 86 BPM | HEIGHT: 68 IN | SYSTOLIC BLOOD PRESSURE: 112 MMHG | DIASTOLIC BLOOD PRESSURE: 64 MMHG | BODY MASS INDEX: 20.46 KG/M2 | WEIGHT: 135 LBS

## 2025-05-13 DIAGNOSIS — N92.6 MISSED PERIOD: Primary | ICD-10-CM

## 2025-05-13 DIAGNOSIS — N76.0 VAGINITIS AND VULVOVAGINITIS: ICD-10-CM

## 2025-05-13 LAB
CONTROL LINE PRESENT WITH A CLEAR BACKGROUND (YES/NO): YES YES/NO
KIT LOT #: NORMAL NUMERIC
PREGNANCY TEST, URINE: NEGATIVE

## 2025-05-13 PROCEDURE — 81514 NFCT DS BV&VAGINITIS DNA ALG: CPT | Performed by: NURSE PRACTITIONER

## 2025-05-13 PROCEDURE — 99213 OFFICE O/P EST LOW 20 MIN: CPT | Performed by: NURSE PRACTITIONER

## 2025-05-13 PROCEDURE — 81025 URINE PREGNANCY TEST: CPT | Performed by: NURSE PRACTITIONER

## 2025-05-13 NOTE — PROGRESS NOTES
Gyne note       S: patient is a 24 year old yo  here for a recent recurrence of increased vaginal discharge with itching. She denies any odor or burning.    Review of Systems:  General: denies fevers, chills, fatigue and malaise.       O:/64   Pulse 86   Ht 68\"   Wt 135 lb (61.2 kg)   LMP 2025 (Exact Date)   BMI 20.53 kg/m²   Gen NAD     GYNE/: External Genitalia: Normal appearing, no lesions. Urethral meatus appear wnl, no abnormal discharge or lesions noted.                                Vagina: normal pink mucosa, no lesions, small white discharge.                      Cervix: nulliparous, no lesions                    A/P:  1. Missed period  - Urine Preg Test [00686]  Call if no menses by early July or continued irregularities     2. Vaginitis and vulvovaginitis  - Vaginitis Vaginosis PCR Panel; Future

## 2025-05-15 ENCOUNTER — HOSPITAL ENCOUNTER (OUTPATIENT)
Dept: ULTRASOUND IMAGING | Age: 25
Discharge: HOME | End: 2025-05-15
Attending: NURSE PRACTITIONER
Payer: COMMERCIAL

## 2025-05-15 DIAGNOSIS — R10.2 PELVIC PAIN: ICD-10-CM

## 2025-05-15 LAB
BV BACTERIA DNA VAG QL NAA+PROBE: NEGATIVE
C GLABRATA DNA VAG QL NAA+PROBE: NEGATIVE
C KRUSEI DNA VAG QL NAA+PROBE: NEGATIVE
CANDIDA DNA VAG QL NAA+PROBE: POSITIVE
T VAGINALIS DNA VAG QL NAA+PROBE: NEGATIVE

## 2025-05-15 PROCEDURE — 76830 TRANSVAGINAL US NON-OB: CPT | Performed by: NURSE PRACTITIONER

## 2025-05-15 PROCEDURE — 76856 US EXAM PELVIC COMPLETE: CPT | Performed by: NURSE PRACTITIONER

## 2025-05-16 NOTE — H&P
[facility] - Gastroenterology                                                                                                               Reason for consult: eval    Requesting physician or provider: Najma Treadwell DO    Chief Complaint   Patient presents with    Abdominal Pain       HPI:   Delfina Klein is a 24 year old year-old female with history of allergic rhinitis, anxiety, bleeding nose, eczema, hashimoto's:    she is here today for evaluation  Constipation since childhood  Over last year has had progressive worsening of abd pain that starts as cramp on left side  Increased gas    She has tried miralax as needed - doesn't always     Bm approx 1 x/week. Has straining. Has mucus in the stool as of recently. No brbr, melena.     Heartburn worse with constipation.  Has dysphagia with meats and pills. Gets stuck below sternal notch. Sx x last 1-2 years.  she denies odynophagia and/or globus. Nausea constant. No vomiting..  she denies recent change in appetite and/or unintentional weight loss.    H/o NOLBERTO (2022)  Ent consult for epistaxis    Ultrasound pelvis (5/2025) w/ possible pelvic congestion syndrome    NSAIDS: advil 1x/mos for menstrual cramps  Tobacco: no  Alcohol: occasional  Marijuana: no  Illicit drugs: no    No FH GI malignancy, IBD, celiac  Father has gerd and difficulty swallowing    No history of adverse reaction to sedation  No ALEX  No anticoagulants  No pacemaker/defibrillator  No pain medications and/or sleep aides      Last colonoscopy: no  Last EGD: no    Wt Readings from Last 6 Encounters:   05/22/25 132 lb (59.9 kg)   05/13/25 135 lb (61.2 kg)   04/22/25 135 lb 4 oz (61.3 kg)   08/09/24 137 lb 12.8 oz (62.5 kg)   03/19/24 134 lb 12.8 oz (61.1 kg)   09/23/23 135 lb (61.2 kg)        History, Medications, Allergies, ROS:      Past Medical History[1]   Past Surgical History[2]   Family Hx: Family  Quality 226: Preventive Care And Screening: Tobacco Use: Screening And Cessation Intervention: Patient screened for tobacco use and is an ex/non-smoker Detail Level: Generalized History[3]   Social History: Short Social Hx on File[4]     Medications (Active prior to today's visit):  Current Medications[5]    Allergies:  Allergies[6]    ROS:   CONSTITUTIONAL: negative for fevers, chills, sweats and weight loss  EYES Negative for red eyes, yellow eyes, changes in vision  HEENT: Positive for dysphagia and negative for hoarseness  RESPIRATORY: Negative for cough and shortness of breath  CARDIOVASCULAR: Negative for chest pain, palpitations  GASTROINTESTINAL: See HPI  GENITOURINARY: Negative for dysuria and frequency  MUSCULOSKELETAL: Negative for arthralgias and myalgias  NEUROLOGICAL: Negative for dizziness and headaches  BEHAVIOR/PSYCH: Negative for anxiety and poor appetite    PHYSICAL EXAM:   Blood pressure 101/66, pulse 84, height 5' 8\" (1.727 m), weight 132 lb (59.9 kg), last menstrual period 04/02/2025.    GEN: WD/WN, NAD  HEENT: Supple symmetrical, trachea midline  CV: RRR, the extremities are warm and well perfused   LUNGS: No increased work of breathing  ABDOMEN: No scars, normal bowel sounds, soft, non-tender, non-distended no rebound or guarding, no masses, no hepatomegaly  MSK: No redness, no warmth, no swelling of joints  SKIN: No jaundice, no erythema, no rashes  HEMATOLOGIC: No bleeding, no bruising  NEURO: Alert and interactive, normal gait    Labs/Imaging/Procedures:     Patient's pertinent labs and imaging were reviewed and discussed with patient today.        .  ASSESSMENT/PLAN:   Delfina Klein is a 24 year old year-old female with history of allergic rhinitis, anxiety, bleeding nose, eczema, hashimoto's:    #constipation  #abd pain  #mucus in stool  #heartburn  #dysphagia  #nausea  Chronic constipation w/ progressive worsening of sx over last 1-2 years.  Has has heartburn, dysphagia with solids, nausea, mucus in the stool.  Noted pelvic ultrasound with possible pelvic congestion syndrome.  Has had f/U with gyne and no further treatment planned. No fhx gi malignancy,  IBD, celiac. Father has gerd/ wdysphagia. Has not had cln and/or egd. Plan as below.     -labs  -hpylori  -pantoprazole trial AFTER hpylori testing  -miralax 1-2 capfuld  -squatty potty    1. Schedule colonoscopy/egd with MAC w/ General pool MD  w/possible dil[Diagnosis:#constipation  #abd pain  #mucus in stool  #heartburn  #dysphagia  #nausea ]    2.  bowel prep from pharmacy (split golytely -Buy over the counter dulcolax laxative, and take one tablet daily for 3 days prior to drinking the bowel prep.   )    3.   For cardiology patients and patients on blood thinners:  Please contact your cardiology clinic for clearance to proceed with the endoscopic procedure. If you are on blood thinners, please also confirm with your cardiologic clinic that you are able to hold the blood thinner per our recommendations.\"    BLOOD THINNER ORDERS:  -Hold for 48 hours (Xarelto, Eliquis, Pradaxa, Savaysa)  -Hold for 3 days (Pletal)  -Hold for 5 days (Coumadin, Plavix, Brilinta, Aggrenox)  -Hold for 7 days (Effient)     For endocrinology insulin patients:    Please contact your endocrinology clinic for insulin adjustment orders prior to your endoscopic procedure.    4. Read all bowel prep instructions carefully    5. AVOID seeds, nuts, popcorn, raw fruits and vegetables (cooked is okay) for 2-3 days before procedure    6.   If you start any NEW medication after your visit today, please notify us. Certain medications will need to be held before the procedure, or the procedure cannot be performed.     >>>Please note: if you were prescribed Suprep for the bowel prep and it is too expensive or not covered by insurance, it is okay to substitute Trilyte (or any similar generic prep). This can be done by notifying the pharmacy or calling our office.      Orders This Visit:  Orders Placed This Encounter   Procedures    CBC W Differential W Platelet    Comp Metabolic Panel (14)    TSH (Assay, Thyroid Stim Hormone)    C-Reactive Protein  Quality 431: Preventive Care And Screening: Unhealthy Alcohol Use - Screening: Patient screened for unhealthy alcohol use using a single question and scores less than 2 times per year    Tissue Transglutaminase Ab, IgA    Immunoglobulin A, Qn, Serum (IGA)    Helicobacter Pylori Breath Test, Adult       Meds This Visit:  Requested Prescriptions     Signed Prescriptions Disp Refills    pantoprazole 40 MG Oral Tab EC 30 tablet 1     Sig: Take 1 tablet (40 mg total) by mouth daily.    polyethylene glycol, PEG 3350-KCl-NaBcb-NaCl-NaSulf, 236 g Oral Recon Soln 4000 mL 0     Sig: Take 4,000 mL by mouth once for 1 dose.       Imaging & Referrals:  None      ENDOSCOPIC RISK BENEFIT DISCUSSION: I described the procedure in great detail with the patient. I discussed the risks and benefits, including but not limited to: bleeding, perforation, infection, anesthesia complications, and even death. Patient will be NPO after midnight and will have a person physically present at time of pick-up to drive patient home. Patient verbalized understanding and agrees to proceed with procedure as planned.    Alana Marino, APRN   5/16/2025        This note was partially prepared using Dragon Medical voice recognition dictation software. As a result, errors may occur. When identified, these errors have been corrected. While every attempt is made to correct errors during dictation, discrepancies may still exist.          [1]   Past Medical History:   Allergic rhinitis    Anxiety    Bleeding nose    Eczema    Hashimoto's disease   [2]   Past Surgical History:  Procedure Laterality Date    Adenoidectomy      Tonsillectomy      [place] teeth removed  08/2021   [3]   Family History  Problem Relation Age of Onset    Thyroid disease Father     Other (endolymphatic tumor) Father     Depression Mother     Migraines Maternal Grandmother     Heart Disease Maternal Grandmother         arrthymia    Renal Disease Maternal Grandmother     Thyroid disease Maternal Grandmother     Diabetes Paternal Grandmother     Migraines Maternal Aunt    [4]   Social History  Socioeconomic History    Marital status: Single   Tobacco Use    Smoking  status: Never    Smokeless tobacco: Never    Tobacco comments:     non-smoker   Vaping Use    Vaping status: Never Used   Substance and Sexual Activity    Alcohol use: Yes     Comment: special occasions    Drug use: Never    Sexual activity: Yes     Partners: Male     Birth control/protection: Condom   Other Topics Concern    Caffeine Concern Yes     Comment: coffee occa    Exercise No     Social Drivers of Health     Food Insecurity: No Food Insecurity (4/22/2025)    NCSS - Food Insecurity     Worried About Running Out of Food in the Last Year: No     Ran Out of Food in the Last Year: No   Transportation Needs: No Transportation Needs (4/22/2025)    NCSS - Transportation     Lack of Transportation: No   Housing Stability: Not At Risk (4/22/2025)    NCSS - Housing/Utilities     Has Housing: Yes     Worried About Losing Housing: No     Unable to Get Utilities: No   [5]   Current Outpatient Medications   Medication Sig Dispense Refill    MAGNESIUM COMPLEX HIGH POTENCY OR Take 300 mg by mouth.      Bacillus Coagulans-Inulin (PROBIOTIC-PREBIOTIC) 1-250 BILLION-MG Oral Cap Take by mouth.      pantoprazole 40 MG Oral Tab EC Take 1 tablet (40 mg total) by mouth daily. 30 tablet 1    polyethylene glycol, PEG 3350-KCl-NaBcb-NaCl-NaSulf, 236 g Oral Recon Soln Take 4,000 mL by mouth once for 1 dose. 4000 mL 0    PREVIDENT 5000 ENAMEL PROTECT 1.1-5 % Dental Gel Place 1 Application onto teeth daily.      Albuterol Sulfate  (90 Base) MCG/ACT Inhalation Aero Soln Inhale 2 puffs into the lungs every 6 (six) hours as needed for Wheezing or Shortness of Breath. 1 Inhaler 0    cetirizine 10 MG Oral Tab Take 1 tablet (10 mg total) by mouth daily.      fluconazole (DIFLUCAN) 150 MG Oral Tab 1 pill PO QOD for 3 doses (Patient not taking: Reported on 5/22/2025) 3 tablet 0    escitalopram 10 MG Oral Tab Take 1 tablet (10 mg total) by mouth daily. (Patient not taking: Reported on 5/22/2025) 30 tablet 0    montelukast 10 MG Oral Tab  Take 1 tablet (10 mg total) by mouth nightly. (Patient not taking: Reported on 5/22/2025) 90 tablet 1   [6] No Known Allergies

## 2025-05-19 ENCOUNTER — PATIENT MESSAGE (OUTPATIENT)
Dept: OBGYN CLINIC | Facility: CLINIC | Age: 25
End: 2025-05-19

## 2025-05-22 ENCOUNTER — TELEPHONE (OUTPATIENT)
Dept: GASTROENTEROLOGY | Facility: CLINIC | Age: 25
End: 2025-05-22

## 2025-05-22 ENCOUNTER — OFFICE VISIT (OUTPATIENT)
Dept: GASTROENTEROLOGY | Facility: CLINIC | Age: 25
End: 2025-05-22

## 2025-05-22 VITALS
SYSTOLIC BLOOD PRESSURE: 101 MMHG | HEART RATE: 84 BPM | DIASTOLIC BLOOD PRESSURE: 66 MMHG | HEIGHT: 68 IN | WEIGHT: 132 LBS | BODY MASS INDEX: 20 KG/M2

## 2025-05-22 DIAGNOSIS — R10.84 GENERALIZED ABDOMINAL PAIN: ICD-10-CM

## 2025-05-22 DIAGNOSIS — R11.0 NAUSEA: ICD-10-CM

## 2025-05-22 DIAGNOSIS — R13.19 ESOPHAGEAL DYSPHAGIA: ICD-10-CM

## 2025-05-22 DIAGNOSIS — K59.00 CONSTIPATION, UNSPECIFIED CONSTIPATION TYPE: Primary | ICD-10-CM

## 2025-05-22 DIAGNOSIS — R12 HEARTBURN: ICD-10-CM

## 2025-05-22 DIAGNOSIS — R19.5 MUCUS IN STOOL: ICD-10-CM

## 2025-05-22 PROCEDURE — 99204 OFFICE O/P NEW MOD 45 MIN: CPT | Performed by: NURSE PRACTITIONER

## 2025-05-22 RX ORDER — PANTOPRAZOLE SODIUM 40 MG/1
40 TABLET, DELAYED RELEASE ORAL DAILY
Qty: 30 TABLET | Refills: 1 | Status: SHIPPED | OUTPATIENT
Start: 2025-05-22 | End: 2025-06-21

## 2025-05-22 RX ORDER — TEA TREE OIL 100 %
OIL (ML) TOPICAL
COMMUNITY

## 2025-05-22 NOTE — PATIENT INSTRUCTIONS
-labs  -hpylori  -pantoprazole trial AFTER hpylori testing  -miralax 1-2 capfuld  -squatty potty    1. Schedule colonoscopy/egd with MAC w/ General pool MD  w/possible dil[Diagnosis:#constipation  #abd pain  #mucus in stool  #heartburn  #dysphagia  #nausea ]    2.  bowel prep from pharmacy (split golytely -Buy over the counter dulcolax laxative, and take one tablet daily for 3 days prior to drinking the bowel prep.   )    3.   For cardiology patients and patients on blood thinners:  Please contact your cardiology clinic for clearance to proceed with the endoscopic procedure. If you are on blood thinners, please also confirm with your cardiologic clinic that you are able to hold the blood thinner per our recommendations.\"    BLOOD THINNER ORDERS:  -Hold for 48 hours (Xarelto, Eliquis, Pradaxa, Savaysa)  -Hold for 3 days (Pletal)  -Hold for 5 days (Coumadin, Plavix, Brilinta, Aggrenox)  -Hold for 7 days (Effient)     For endocrinology insulin patients:    Please contact your endocrinology clinic for insulin adjustment orders prior to your endoscopic procedure.    4. Read all bowel prep instructions carefully    5. AVOID seeds, nuts, popcorn, raw fruits and vegetables (cooked is okay) for 2-3 days before procedure    6.   If you start any NEW medication after your visit today, please notify us. Certain medications will need to be held before the procedure, or the procedure cannot be performed.     >>>Please note: if you were prescribed Suprep for the bowel prep and it is too expensive or not covered by insurance, it is okay to substitute Trilyte (or any similar generic prep). This can be done by notifying the pharmacy or calling our office.      Tips to Control Acid Reflux    To control acid reflux, you’ll need to make some basic diet and lifestyle changes. The simple steps outlined below may be all you’ll need to ease discomfort.   Watch what you eat  Don't have fatty foods or spicy foods.  Eat fewer acidic  foods, such as citrus and tomato-based foods. These can increase symptoms.  Limit drinking alcohol, caffeine, and fizzy beverages. All increase acid reflux.  Try limiting chocolate, peppermint, and spearmint. These can make acid reflux worse in some people.     Watch when you eat  Don't lie down for 3 hours after eating.  Don't snack before going to bed.     Raise your head  Raising your head and upper body by 4 to 6 inches helps limit reflux when you’re lying down. Put blocks under the head of your bed frame or a wedge under your mattress to raise it.   Other changes  Lose weight, if you need to  Don’t exercise near bedtime  Don't wear tight-fitting clothes  Limit aspirin and ibuprofen  Stop smoking     [employer] last reviewed this educational content on 6/1/2019  © 6426-5790 [employer] [place]. [address] [place] [address], [place] [address]. All rights reserved. This information is not intended as a substitute for professional medical care. Always follow your healthcare professional's instructions.

## 2025-05-22 NOTE — TELEPHONE ENCOUNTER
Schedulers, see providers orders below:     -Patient was seen in office with Alana hernandez and was provided with written and verbal procedure prep instructions, including any medication adjustments.   -Patient was advised to call medical insurance for any questions on benefits and/or any out of pocket costs.   -Patient is aware that the GI schedulers will be calling to schedule procedure(s) and that providers may not have any availability until possibly end of the year.           1. Schedule colonoscopy/egd with MAC w/ General pool MD  w/possible dil  [Diagnosis:#constipation  #abd pain  #mucus in stool  #heartburn  #dysphagia  #nausea ]     2.  bowel prep from pharmacy (split golytely -Buy over the counter dulcolax laxative, and take one tablet daily for 3 days prior to drinking the bowel prep.   )     3.     For cardiology patients and patients on blood thinners:  Please contact your cardiology clinic for clearance to proceed with the endoscopic procedure. If you are on blood thinners, please also confirm with your cardiologic clinic that you are able to hold the blood thinner per our recommendations.\"     BLOOD THINNER ORDERS:  -Hold for 48 hours (Xarelto, Eliquis, Pradaxa, Savaysa)  -Hold for 3 days (Pletal)  -Hold for 5 days (Coumadin, Plavix, Brilinta, Aggrenox)  -Hold for 7 days (Effient)      For endocrinology insulin patients:     Please contact your endocrinology clinic for insulin adjustment orders prior to your endoscopic procedure.     4. Read all bowel prep instructions carefully     5. AVOID seeds, nuts, popcorn, raw fruits and vegetables (cooked is okay) for 2-3 days before procedure     6.   If you start any NEW medication after your visit today, please notify us. Certain medications will need to be held before the procedure, or the procedure cannot be performed.

## 2025-05-23 ENCOUNTER — TELEPHONE (OUTPATIENT)
Facility: CLINIC | Age: 25
End: 2025-05-23

## 2025-05-23 NOTE — TELEPHONE ENCOUNTER
Current Outpatient Medications   Medication Sig Dispense Refill    pantoprazole 40 MG Oral Tab EC Take 1 tablet (40 mg total) by mouth daily. 30 tablet 1

## 2025-05-23 NOTE — TELEPHONE ENCOUNTER
I called the patient's pharmacy    I explained that prescription for pantoprazole was sent over to pharmacy yesterday    Pharmacy states that they received prescription. Pharmacy to fill prescription and notify patient when it is ready for

## 2025-05-23 NOTE — TELEPHONE ENCOUNTER
Scheduled for: Colonoscopy/Esophagogastroduodenoscopy with possible Dilation [id number]    Provider Name:   Felipamadonna    Date:  7/11/2025    Location:    [facility]    Sedation:  MAC    Time:  2:00 pm (Patient made aware [facility] will call the day before with procedure/arrival time)    Prep:  Golytely    Meds/Allergies Reconciled?:  Physician reviewed     Diagnosis with codes:    Constipation, unspecified constipation type [K59.00]   Generalized abdominal pain [R10.84]  Mucus in stool [R19.5]   Heartburn [R12]  Esophageal dysphagia [R13.19]   Nausea [R11.0]      Was patient informed to call insurance with codes (Y/N):  Yes, I confirmed [employer] insurance with the patient.    Advised Patient: Please be sure to advise our office of any insurance changes as soon as possible to avoid possible cancellation of procedure      Referral sent?:  N/A    [facility] or [facility] notified?:  I sent an electronic request to Endo Scheduling and received a confirmation today.      Medication Orders:  This patient verbally confirmed that she is not taking:    Iron, blood thinners, BP meds, and is not diabetic    No latex allergy, No PCN allergy and does not have a pacemaker     Misc Orders:    Hold all Vitamins/Supplements 14 days prior to procedure     Further instructions given by staff:   I discussed the prep instructions with the patient which she verbally understood and is aware that I will send the instructions today via [employer].    Advised patient:    You will not be able to drive, operate machinery or make critical decisions the day of your procedure. Please make arrangements for transportation. You must have a  (age 18 or older) to accompany you, stay in the facility for the duration of your procedure and drive you home after the procedure.  You cannot use public transportation (Uber, Lyft, Taxi). The procedure involves sedation, and you will not be allowed to leave unaccompanied. Your procedure will not proceed forward if you're  unable to confirm your  planned to escort you home.    Advised Patient:    [facility] requires payment of copay and any patient responsibility at the time of registration.   The [facility] requires copay and 50% of the patient responsibility at the time of service for all Esophagogastroduodenoscopy and diagnostic Colonoscopies.     They do offer payment plans and Care Credit options if unable to pay the full amount at the time of registration.     If you have any questions regarding your potential responsibility, please contact [facility] Insurance Department at [phone] option 1.    You may receive 4 bills related to your medical procedure:   [facility] (the facility)  The procedural physician  The anesthesiologist  The pathology lab (if applicable)

## 2025-05-28 ENCOUNTER — TELEPHONE (OUTPATIENT)
Facility: CLINIC | Age: 25
End: 2025-05-28

## 2025-05-28 NOTE — TELEPHONE ENCOUNTER
Patient is requesting current lab orders be sent to [facility].  She is requesting a call back after orders are corrected.  Please call

## 2025-05-28 NOTE — TELEPHONE ENCOUNTER
Faxed over labs to [facility] in [place] at [phone]. Fax confirmation received, and left message patient on voicemail for patient letting her know labs were faxed as requested.

## 2025-05-28 NOTE — TELEPHONE ENCOUNTER
Left message for patient to find out which [facility] labs she will have done so we can fax orders to them. Orders updated from [facility] to [facility] in [facility]

## 2025-05-29 DIAGNOSIS — R07.89 CHEST TIGHTNESS: ICD-10-CM

## 2025-05-29 RX ORDER — ALBUTEROL SULFATE 90 UG/1
2 INHALANT RESPIRATORY (INHALATION) EVERY 6 HOURS PRN
Qty: 1 EACH | Refills: 0 | Status: SHIPPED | OUTPATIENT
Start: 2025-05-29

## 2025-05-29 NOTE — TELEPHONE ENCOUNTER
Asthma & COPD Medication Protocol Ltuohr1105/29/2025 01:34 PM   Protocol Details ACT Score greater than or equal to 20    Appointment in past 6 or next 3 months    ACT recorded in the last 12 months    Medication is active on med list          Last refill:   Albuterol Sulfate  (90 Base) MCG/ACT Inhalation Aero Soln 1 Inhaler 0 1/4/2021     Last Visit: 8/9/24    Next Visit:   Future Appointments   Date Time Provider Department Center   7/11/2025  2:00 PM [facility], PROCEDURE [id number] None         Forward to Dr. Treadwell please advise on refills. Thanks.

## 2025-06-14 LAB — RESULT:: NOT DETECTED

## 2025-06-16 LAB
ABSOLUTE BASOPHILS: 20 CELLS/UL (ref 0–200)
ABSOLUTE EOSINOPHILS: 39 CELLS/UL (ref 15–500)
ABSOLUTE LYMPHOCYTES: 1495 CELLS/UL (ref 850–3900)
ABSOLUTE MONOCYTES: 735 CELLS/UL (ref 200–950)
ABSOLUTE NEUTROPHILS: 2612 CELLS/UL (ref 1500–7800)
ALBUMIN/GLOBULIN RATIO: 1.7 (CALC) (ref 1–2.5)
ALBUMIN: 4.3 G/DL (ref 3.6–5.1)
ALKALINE PHOSPHATASE: 47 U/L (ref 31–125)
ALT: 18 U/L (ref 6–29)
AST: 22 U/L (ref 10–30)
BASOPHILS: 0.4 %
BILIRUBIN, TOTAL: 0.4 MG/DL (ref 0.2–1.2)
BUN: 13 MG/DL (ref 7–25)
C-REACTIVE PROTEIN: <3 MG/L
CALCIUM: 8.9 MG/DL (ref 8.6–10.2)
CARBON DIOXIDE: 23 MMOL/L (ref 20–32)
CHLORIDE: 109 MMOL/L (ref 98–110)
CREATININE: 0.62 MG/DL (ref 0.5–0.96)
EGFR: 129 ML/MIN/1.73M2
EOSINOPHILS: 0.8 %
GLOBULIN: 2.6 G/DL (CALC) (ref 1.9–3.7)
GLUCOSE: 95 MG/DL (ref 65–99)
HEMATOCRIT: 38.2 % (ref 35–45)
HEMOGLOBIN: 12.5 G/DL (ref 11.7–15.5)
IMMUNOGLOBULIN A: 152 MG/DL (ref 47–310)
LYMPHOCYTES: 30.5 %
MCH: 29.3 PG (ref 27–33)
MCHC: 32.7 G/DL (ref 32–36)
MCV: 89.5 FL (ref 80–100)
MONOCYTES: 15 %
MPV: 11.4 FL (ref 7.5–12.5)
NEUTROPHILS: 53.3 %
PLATELET COUNT: 258 THOUSAND/UL (ref 140–400)
POTASSIUM: 4.5 MMOL/L (ref 3.5–5.3)
PROTEIN, TOTAL: 6.9 G/DL (ref 6.1–8.1)
RDW: 12.2 % (ref 11–15)
RED BLOOD CELL COUNT: 4.27 MILLION/UL (ref 3.8–5.1)
SODIUM: 140 MMOL/L (ref 135–146)
TISSUE TRANSGLUTAMINASE$ANTIBODY, IGA: <1 U/ML
TSH: 0.91 MIU/L
WHITE BLOOD CELL COUNT: 4.9 THOUSAND/UL (ref 3.8–10.8)

## 2025-07-03 ENCOUNTER — TELEPHONE (OUTPATIENT)
Facility: CLINIC | Age: 25
End: 2025-07-03

## 2025-07-03 NOTE — TELEPHONE ENCOUNTER
Patient was called to confirm upcoming procedures.  Left voice mail to return our call if you any  have questions  regarding  your procedure.

## 2025-07-05 RX ORDER — FLUCONAZOLE 150 MG/1
TABLET ORAL
Qty: 3 TABLET | Refills: 0 | Status: CANCELLED | OUTPATIENT
Start: 2025-07-05

## 2025-07-07 NOTE — TELEPHONE ENCOUNTER
Last annual exam: 25  Follow-up recommended: Call if no menses by early July or continued irregularities   Last refill date:   Medication Quantity Refills Start End   fluconazole (DIFLUCAN) 150 MG Oral Tab 3 tablet 0 5/15/2025 --   Si pill PO QOD for 3 doses     Patient not taking:   Reported on 2025     Next appt.: none scheduled    Message sent to patient- update on symptoms requested.

## 2025-07-11 PROBLEM — R13.19 ESOPHAGEAL DYSPHAGIA: Status: ACTIVE | Noted: 2025-07-11

## 2025-07-11 PROBLEM — R19.4 CHANGE IN BOWEL HABITS: Status: ACTIVE | Noted: 2025-07-11

## 2025-07-11 PROBLEM — R14.0 BLOATING: Status: ACTIVE | Noted: 2025-07-11

## 2025-07-16 ENCOUNTER — RESULTS FOLLOW-UP (OUTPATIENT)
Facility: CLINIC | Age: 25
End: 2025-07-16

## 2025-07-17 NOTE — TELEPHONE ENCOUNTER
I contacted the pt. No answer. I left a detailed message and asked that she call me if on-going issues with dysphagia so that further work-up can be done.   Pt my-charted results and recommendations.

## 2025-07-17 NOTE — TELEPHONE ENCOUNTER
----- Message from Justo Hooks sent at 7/16/2025  5:02 PM CDT -----  Alana, can you let Delfina know that all of her biopsies looked good.  If the dysphagia continues you could consider an esophagram with a solid component and if abnormal manometry.  ----- Message -----  From: [facility]  Sent: 7/15/2025   9:15 PM CDT  To: Justo Hooks MD

## 2025-07-25 ENCOUNTER — TELEPHONE (OUTPATIENT)
Facility: CLINIC | Age: 25
End: 2025-07-25

## 2025-08-07 DIAGNOSIS — R07.89 CHEST TIGHTNESS: ICD-10-CM

## 2025-08-07 RX ORDER — ALBUTEROL SULFATE 90 UG/1
2 INHALANT RESPIRATORY (INHALATION) EVERY 6 HOURS PRN
Qty: 8.5 G | Refills: 0 | Status: SHIPPED | OUTPATIENT
Start: 2025-08-07

## (undated) DIAGNOSIS — F41.9 ANXIETY: ICD-10-CM

## (undated) DIAGNOSIS — D64.9 ANEMIA, UNSPECIFIED TYPE: Primary | ICD-10-CM

## (undated) DIAGNOSIS — R51.9 SINUS HEADACHE: ICD-10-CM

## (undated) DIAGNOSIS — R51.9 SINUS HEADACHE: Primary | ICD-10-CM

## (undated) NOTE — LETTER
Date: 7/27/2021    Patient Name: Jimbo Sender          To Whom it may concern: This letter has been written at the patient's request. The above patient was seen at the [facility] for treatment of a medical condition.     This patient shoul

## (undated) NOTE — Clinical Note
HI Dr. Yoana Velez,  Just an update on [address]- her work up showed a neg MALGORZATA but +RNP, likely meaning the latter is a false positive. Could be cross reacting with the TPO ab. I will be monitoring her for MCTD but I recommended she follow back with you vs maddie for her thyroid disease. Thanks!  And take care,  [facility]

## (undated) NOTE — Clinical Note
Hi, Dr. Dow Closs! Thank you for referring Ms. Colton Mcburney Schori for rheumatologic evaluation. Please see the discussion portion of my note and let me know if you have any questions.      Trung Garcia, DO  [facility]  6/1/2022

## (undated) NOTE — LETTER
Date: 11/6/2019    Patient Name: Robert Terry          To Whom it may concern: The above patient was seen at the [facility] for treatment of a medical condition.     This patient should be excused from attending school from 10/31/19 robert

## (undated) NOTE — Clinical Note
Please mail or fax those [facility] orders left on your desk for pt. There were a few more that I ordered so please be sure all printed.    Emeli Rutherford, DO [facility] 12/29/2022

## (undated) NOTE — LETTER
[facility]   [address] [place] [address]           Dear Camilla Rawls     Our records indicate that you have outstanding lab work and or testing that was ordered for you and has not yet been completed: Orders to go to [facility] have been enclosed with letter. Lab Frequency Next Occurrence      Order Code Tests Ordered (Total: 2)    Order Code Tests Ordered      899 ASSAY, THYROID STIM HORMONE    866 FREE T4 (FREE THYROXINE)          To provide you with the best possible care, please complete these orders at your earliest convenience. If you have recently completed these orders please disregard this letter. If you have any questions please call the office at [phone].      Thank you,     [facility]

## (undated) NOTE — LETTER
Date: 5/17/2022    Patient Name: Noé Hidalgo          To Whom it may concern: This letter has been written at the patient's request. The above patient was seen at the [facility] for treatment of a medical condition. This patient should be excused from attending work/school on 5/17/22.         Sincerely,    Jade Webb DO